# Patient Record
Sex: FEMALE | Race: WHITE | URBAN - METROPOLITAN AREA
[De-identification: names, ages, dates, MRNs, and addresses within clinical notes are randomized per-mention and may not be internally consistent; named-entity substitution may affect disease eponyms.]

---

## 2019-10-18 ENCOUNTER — OFFICE VISIT (OUTPATIENT)
Dept: FAMILY MEDICINE CLINIC | Age: 44
End: 2019-10-18
Payer: COMMERCIAL

## 2019-10-18 ENCOUNTER — HOSPITAL ENCOUNTER (OUTPATIENT)
Age: 44
Setting detail: SPECIMEN
Discharge: HOME | End: 2019-10-18
Payer: COMMERCIAL

## 2019-10-18 VITALS
SYSTOLIC BLOOD PRESSURE: 168 MMHG | RESPIRATION RATE: 18 BRPM | HEART RATE: 85 BPM | DIASTOLIC BLOOD PRESSURE: 111 MMHG | OXYGEN SATURATION: 97 % | TEMPERATURE: 98 F

## 2019-10-18 DIAGNOSIS — N30.00 ACUTE CYSTITIS WITHOUT HEMATURIA: Primary | ICD-10-CM

## 2019-10-18 DIAGNOSIS — N30.00 ACUTE CYSTITIS WITHOUT HEMATURIA: ICD-10-CM

## 2019-10-18 PROCEDURE — 99202 OFFICE O/P NEW SF 15 MIN: CPT | Performed by: NURSE PRACTITIONER

## 2019-10-18 RX ORDER — ZIPRASIDONE HYDROCHLORIDE 80 MG/1
CAPSULE ORAL
COMMUNITY

## 2019-10-18 RX ORDER — SULFAMETHOXAZOLE AND TRIMETHOPRIM 800; 160 MG/1; MG/1
1 TABLET ORAL 2 TIMES DAILY
Qty: 10 TABLET | Refills: 0 | Status: SHIPPED | OUTPATIENT
Start: 2019-10-18 | End: 2019-10-23

## 2019-10-18 RX ORDER — DILTIAZEM HYDROCHLORIDE 120 MG/1
CAPSULE, COATED, EXTENDED RELEASE ORAL
COMMUNITY

## 2019-10-18 RX ORDER — DESVENLAFAXINE 100 MG/1
TABLET, EXTENDED RELEASE ORAL
COMMUNITY

## 2019-10-18 RX ORDER — BENZTROPINE MESYLATE 2 MG/1
2 TABLET ORAL
COMMUNITY
End: 2020-05-18

## 2019-10-18 ASSESSMENT — ENCOUNTER SYMPTOMS
CONSTIPATION: 0
COUGH: 0
SHORTNESS OF BREATH: 0
CHEST TIGHTNESS: 0
NAUSEA: 0
VOMITING: 0
RESPIRATORY NEGATIVE: 1
WHEEZING: 0
DIARRHEA: 0

## 2019-10-19 LAB
CULTURE: NO GROWTH
Lab: NORMAL
SPECIMEN DESCRIPTION: NORMAL

## 2020-05-18 ENCOUNTER — OFFICE VISIT (OUTPATIENT)
Dept: FAMILY MEDICINE CLINIC | Age: 45
End: 2020-05-18
Payer: COMMERCIAL

## 2020-05-18 VITALS
TEMPERATURE: 98.8 F | HEIGHT: 64 IN | DIASTOLIC BLOOD PRESSURE: 91 MMHG | SYSTOLIC BLOOD PRESSURE: 148 MMHG | WEIGHT: 285 LBS | HEART RATE: 81 BPM | BODY MASS INDEX: 48.65 KG/M2 | OXYGEN SATURATION: 97 %

## 2020-05-18 PROCEDURE — 96372 THER/PROPH/DIAG INJ SC/IM: CPT | Performed by: PHYSICIAN ASSISTANT

## 2020-05-18 PROCEDURE — 99213 OFFICE O/P EST LOW 20 MIN: CPT | Performed by: PHYSICIAN ASSISTANT

## 2020-05-18 RX ORDER — DESVENLAFAXINE 100 MG/1
TABLET, EXTENDED RELEASE ORAL
COMMUNITY
End: 2020-05-18

## 2020-05-18 RX ORDER — CYCLOBENZAPRINE HCL 5 MG
5 TABLET ORAL 2 TIMES DAILY PRN
Qty: 20 TABLET | Refills: 0 | Status: SHIPPED | OUTPATIENT
Start: 2020-05-18 | End: 2020-05-28

## 2020-05-18 RX ORDER — METHYLPREDNISOLONE 4 MG/1
TABLET ORAL
Qty: 1 KIT | Refills: 0 | Status: SHIPPED | OUTPATIENT
Start: 2020-05-18

## 2020-05-18 RX ORDER — KETOROLAC TROMETHAMINE 30 MG/ML
60 INJECTION, SOLUTION INTRAMUSCULAR; INTRAVENOUS ONCE
Status: COMPLETED | OUTPATIENT
Start: 2020-05-18 | End: 2020-05-18

## 2020-05-18 RX ORDER — BENZTROPINE MESYLATE 1 MG/1
TABLET ORAL
COMMUNITY

## 2020-05-18 RX ORDER — ZOLPIDEM TARTRATE 5 MG/1
TABLET ORAL
COMMUNITY

## 2020-05-18 RX ORDER — IBUPROFEN 800 MG/1
800 TABLET ORAL EVERY 6 HOURS PRN
Qty: 28 TABLET | Refills: 0 | Status: SHIPPED | OUTPATIENT
Start: 2020-05-18 | End: 2020-05-25

## 2020-05-18 RX ADMIN — KETOROLAC TROMETHAMINE 60 MG: 30 INJECTION, SOLUTION INTRAMUSCULAR; INTRAVENOUS at 16:00

## 2020-05-18 ASSESSMENT — PATIENT HEALTH QUESTIONNAIRE - PHQ9
SUM OF ALL RESPONSES TO PHQ9 QUESTIONS 1 & 2: 0
2. FEELING DOWN, DEPRESSED OR HOPELESS: 0
SUM OF ALL RESPONSES TO PHQ QUESTIONS 1-9: 0
SUM OF ALL RESPONSES TO PHQ QUESTIONS 1-9: 0
1. LITTLE INTEREST OR PLEASURE IN DOING THINGS: 0

## 2020-05-18 ASSESSMENT — ENCOUNTER SYMPTOMS
ABDOMINAL PAIN: 0
VOMITING: 0
RESPIRATORY NEGATIVE: 1
NAUSEA: 0
VISUAL CHANGE: 0
BOWEL INCONTINENCE: 0

## 2020-05-18 NOTE — PATIENT INSTRUCTIONS
hours. Put a thin cloth between the ice pack and your skin. · Take pain medicines exactly as directed. ? If the doctor gave you a prescription medicine for pain, take it as prescribed. ? If you are not taking a prescription pain medicine, ask your doctor if you can take an over-the-counter medicine. · Take short walks several times a day. You can start with 5 to 10 minutes, 3 or 4 times a day, and work up to longer walks. Walk on level surfaces and avoid hills and stairs until your back is better. · Return to work and other activities as soon as you can. Continued rest without activity is usually not good for your back. · To prevent future back pain, do exercises to stretch and strengthen your back and stomach. Learn how to use good posture, safe lifting techniques, and proper body mechanics. When should you call for help? Call your doctor now or seek immediate medical care if:    · You have new or worsening numbness in your legs.     · You have new or worsening weakness in your legs. (This could make it hard to stand up.)     · You lose control of your bladder or bowels.    Watch closely for changes in your health, and be sure to contact your doctor if:    · You have a fever, lose weight, or don't feel well.     · You do not get better as expected. Where can you learn more? Go to https://[employer]emids. org and sign in to your [employer] account. Enter P807 in the [facility] box to learn more about \"Back Pain: Care Instructions. \"     If you do not have an account, please click on the \"Sign Up Now\" link. Current as of: June 26, 2019Content Version: 12.4  © 3919-9627 [employer]. Care instructions adapted under license by [facility] ([facility]). If you have questions about a medical condition or this instruction, always ask your healthcare professional. [place] [address] any warranty or liability for your use of this information.          Patient Education

## 2020-05-18 NOTE — PROGRESS NOTES
understanding of these instructions and did not have anyfurther questions or complaints. PROCEDURES:  Orders Placed This Encounter   Procedures    XR FOOT LEFT (MIN 3 VIEWS)     Standing Status:   Future     Standing Expiration Date:   5/18/2021     Order Specific Question:   Reason for exam:     Answer:   Left foot pain s/p fall    XR ANKLE LEFT (MIN 3 VIEWS)     Standing please     Standing Status:   Future     Standing Expiration Date:   5/18/2021     Order Specific Question:   Reason for exam:     Answer:   left ankle pain s/p fall 1 week ago    XR KNEE LEFT (3 VIEWS)     Standing please     Standing Status:   Future     Standing Expiration Date:   5/18/2021     Order Specific Question:   Reason for exam:     Answer:   left knee pain s/p fall    XR HIP LEFT (2-3 VIEWS)     Standing please     Standing Status:   Future     Standing Expiration Date:   5/18/2021     Order Specific Question:   Reason for exam:     Answer:   Left hip s/p fall    XR LUMBAR SPINE (2-3 VIEWS)     Standing Status:   Future     Standing Expiration Date:   5/18/2021     Order Specific Question:   Reason for exam:     Answer:   left lower back pain/ gluteal s/p fall    XR TIBIA FIBULA LEFT (2 VIEWS)     Standing please     Standing Status:   Future     Standing Expiration Date:   5/18/2021     Order Specific Question:   Reason for exam:     Answer:   lower leg pain s/p fall       No results found for this visit on 05/18/20.     FINALIMPRESSION      Visit Diagnoses and Associated Orders     Left foot pain    -  Primary    XR FOOT LEFT (MIN 3 VIEWS) [40948 Custom]   - Future Order         Acute left ankle pain        XR ANKLE LEFT (MIN 3 VIEWS) [77542 Custom]   - Future Order         Acute pain of left knee        XR KNEE LEFT (3 VIEWS) [94509 Custom]   - Future Order         Left hip pain        XR HIP LEFT (2-3 VIEWS) [75222 Custom]   - Future Order         Acute left-sided low back pain with left-sided sciatica        XR LUMBAR SPINE (2-3 VIEWS) [[id number] Custom]   - Future Order         Anterior leg pain, left        XR TIBIA FIBULA LEFT (2 VIEWS) [40424 Custom]   - Future Order         ORDERS WITHOUT AN ASSOCIATED DIAGNOSIS    zolpidem (AMBIEN) 5 MG tablet [36148]      benztropine (COGENTIN) 1 MG tablet [999]      ibuprofen (ADVIL;MOTRIN) 800 MG tablet [3845]      methylPREDNISolone (MEDROL DOSEPACK) 4 MG tablet [4991]      cyclobenzaprine (FLEXERIL) 5 MG tablet [20994]      ketorolac (TORADOL) injection 60 mg [01671]              PLAN     Return if symptoms worsen or fail to improve. DISCHARGEMEDICATIONS:  Orders Placed This Encounter   Medications    ibuprofen (ADVIL;MOTRIN) 800 MG tablet     Sig: Take 1 tablet by mouth every 6 hours as needed for Pain     Dispense:  28 tablet     Refill:  0    methylPREDNISolone (MEDROL DOSEPACK) 4 MG tablet     Sig: Take as medrol dose pack. Take by mouth. Dispense:  1 kit     Refill:  0    cyclobenzaprine (FLEXERIL) 5 MG tablet     Sig: Take 1 tablet by mouth 2 times daily as needed for Muscle spasms     Dispense:  20 tablet     Refill:  0    ketorolac (TORADOL) injection 60 mg         Plan:  1. RICE therapy  2. Tylenol/motrin for pain as needed  3. X-ray as ordered - possible treatment plan alteration as discussed. Patient instructed to return to the office if symptoms worsen, return, or have any other concerns. Patient understands and is agreeable.          Rere Powell PA-C 5/18/2020 4:27 PM

## 2020-05-19 ENCOUNTER — HOSPITAL ENCOUNTER (OUTPATIENT)
Dept: GENERAL RADIOLOGY | Age: 45
Discharge: HOME | End: 2020-05-21
Payer: COMMERCIAL

## 2020-05-19 ENCOUNTER — HOSPITAL ENCOUNTER (OUTPATIENT)
Age: 45
Discharge: HOME | End: 2020-05-21
Payer: COMMERCIAL

## 2020-05-19 PROCEDURE — 73590 X-RAY EXAM OF LOWER LEG: CPT

## 2020-05-19 PROCEDURE — 73502 X-RAY EXAM HIP UNI 2-3 VIEWS: CPT

## 2020-05-19 PROCEDURE — 72100 X-RAY EXAM L-S SPINE 2/3 VWS: CPT

## 2020-05-19 PROCEDURE — 73610 X-RAY EXAM OF ANKLE: CPT

## 2020-05-19 PROCEDURE — 73630 X-RAY EXAM OF FOOT: CPT

## 2020-05-19 PROCEDURE — 73562 X-RAY EXAM OF KNEE 3: CPT

## 2025-02-04 ENCOUNTER — TELEPHONE (OUTPATIENT)
Dept: BARIATRICS/WEIGHT MGMT | Age: 50
End: 2025-02-04

## 2025-02-28 ENCOUNTER — OFFICE VISIT (OUTPATIENT)
Dept: BARIATRICS/WEIGHT MGMT | Age: 50
End: 2025-02-28
Payer: COMMERCIAL

## 2025-02-28 VITALS
HEIGHT: 64 IN | WEIGHT: 270 LBS | OXYGEN SATURATION: 96 % | SYSTOLIC BLOOD PRESSURE: 123 MMHG | DIASTOLIC BLOOD PRESSURE: 73 MMHG | HEART RATE: 86 BPM | BODY MASS INDEX: 46.1 KG/M2

## 2025-02-28 DIAGNOSIS — K21.9 GASTROESOPHAGEAL REFLUX DISEASE WITHOUT ESOPHAGITIS: ICD-10-CM

## 2025-02-28 DIAGNOSIS — I10 ESSENTIAL HYPERTENSION: ICD-10-CM

## 2025-02-28 DIAGNOSIS — E66.01 MORBID OBESITY WITH BMI OF 45.0-49.9, ADULT: Primary | ICD-10-CM

## 2025-02-28 PROCEDURE — 3074F SYST BP LT 130 MM HG: CPT | Performed by: SURGERY

## 2025-02-28 PROCEDURE — 3078F DIAST BP <80 MM HG: CPT | Performed by: SURGERY

## 2025-02-28 PROCEDURE — 99204 OFFICE O/P NEW MOD 45 MIN: CPT | Performed by: SURGERY

## 2025-02-28 RX ORDER — LISINOPRIL 10 MG/1
1 TABLET ORAL
COMMUNITY

## 2025-02-28 RX ORDER — PHENTERMINE HYDROCHLORIDE 37.5 MG/1
TABLET ORAL
COMMUNITY

## 2025-02-28 NOTE — PROGRESS NOTES
[facility] INVASIVE BARIATRIC SURG  [address]  [address]  [place] [id number]  Dept: [phone]    SURGICAL WEIGHT MANAGEMENT PROGRAM  PROGRESS NOTE INITIAL EVALUATION     Patient: Payam Bailey        Service Date: 2/28/2025      HPI:     Chief Complaint   Patient presents with    Bariatric, Initial Visit    Weight Loss       The patient is a pleasant 49 y.o. year old female  with morbid obesity, who stands Height: 161.9 cm (5' 3.75\") tall with a weight of Weight - Scale: 122.5 kg (270 lb) , resulting in a BMI of Body mass index is 46.71 kg/m².. The patient suffers from multiple co-morbidities as a result of morbid obesity, including: Hypertension and GERD. She has suffered from obesity for many years.     The patient denies  a history of myocardial infarction, deep vein thrombosis, pulmonary embolism, renal failure, hepatic failure, and stroke. Patient denies nicotine. She does drink alcohol on occasion.     The patient has failed multiple attempts at non-surgical weight loss, and is now seeking surgical intervention to promote permanent and consistent weight loss. She  has chosen Abram-en-Y Gastric Bypass.  She is well educated regarding it, as she has recently viewed our weight loss surgery informational seminar.     Medical History:  Past Medical History:   Diagnosis Date    Bipolar disorder (HCC)        Surgical History:  Past Surgical History:   Procedure Laterality Date    BREAST LUMPECTOMY Left     CHOLECYSTECTOMY      PARTIAL HYSTERECTOMY         Family History:  No family history on file.    Social History:   Social History     Tobacco Use    Smoking status: Never    Smokeless tobacco: Never   Substance Use Topics    Alcohol use: Not Currently    Drug use: Never       Current Med List:  Current Outpatient Medications   Medication Sig Dispense Refill    lisinopril (PRINIVIL;ZESTRIL) 10 MG tablet Take 1 tablet by mouth Every Day

## 2025-03-04 ENCOUNTER — NURSE ONLY (OUTPATIENT)
Dept: BARIATRICS/WEIGHT MGMT | Age: 50
End: 2025-03-04

## 2025-03-04 VITALS — BODY MASS INDEX: 46.69 KG/M2 | WEIGHT: 269.9 LBS

## 2025-03-04 DIAGNOSIS — E66.01 MORBID OBESITY WITH BMI OF 45.0-49.9, ADULT: Primary | ICD-10-CM

## 2025-03-04 NOTE — PROGRESS NOTES
mins per day for at least 24 days of the month. \"Exercise for Health\" and exercise logs were provided to the patient along with education on the importance of exercise for weight management. All additional goals set with the patient are displayed above.   Will follow up each month and provide support as patient begins to add physical activity to lifestyle.    Monitoring and Evaluation:  Monitor and review goals, adjust as needed.  Follow up monthly for supervised diet and exercise.     Gisel Babin MS, RD, LD  Clinical Dietitian  [facility]  [phone]

## 2025-03-19 ENCOUNTER — CLINICAL SUPPORT (OUTPATIENT)
Dept: BARIATRICS/WEIGHT MGMT | Age: 50
End: 2025-03-19

## 2025-04-08 ENCOUNTER — OFFICE VISIT (OUTPATIENT)
Dept: BARIATRICS/WEIGHT MGMT | Age: 50
End: 2025-04-08
Payer: COMMERCIAL

## 2025-04-08 VITALS
SYSTOLIC BLOOD PRESSURE: 127 MMHG | HEIGHT: 64 IN | DIASTOLIC BLOOD PRESSURE: 72 MMHG | WEIGHT: 267 LBS | HEART RATE: 80 BPM | OXYGEN SATURATION: 97 % | BODY MASS INDEX: 45.58 KG/M2

## 2025-04-08 DIAGNOSIS — G47.33 OSA (OBSTRUCTIVE SLEEP APNEA): ICD-10-CM

## 2025-04-08 DIAGNOSIS — I10 ESSENTIAL HYPERTENSION: Primary | ICD-10-CM

## 2025-04-08 DIAGNOSIS — E55.9 VITAMIN D INSUFFICIENCY: ICD-10-CM

## 2025-04-08 DIAGNOSIS — Z86.2 HISTORY OF ANEMIA: ICD-10-CM

## 2025-04-08 DIAGNOSIS — E66.01 MORBID OBESITY WITH BMI OF 45.0-49.9, ADULT: ICD-10-CM

## 2025-04-08 DIAGNOSIS — F31.9 BIPOLAR DEPRESSION (HCC): ICD-10-CM

## 2025-04-08 DIAGNOSIS — R73.9 ELEVATED BLOOD SUGAR: ICD-10-CM

## 2025-04-08 DIAGNOSIS — K21.9 GASTROESOPHAGEAL REFLUX DISEASE WITHOUT ESOPHAGITIS: ICD-10-CM

## 2025-04-08 PROCEDURE — 99214 OFFICE O/P EST MOD 30 MIN: CPT | Performed by: NURSE PRACTITIONER

## 2025-04-08 PROCEDURE — 3078F DIAST BP <80 MM HG: CPT | Performed by: NURSE PRACTITIONER

## 2025-04-08 PROCEDURE — 3074F SYST BP LT 130 MM HG: CPT | Performed by: NURSE PRACTITIONER

## 2025-04-08 NOTE — PROGRESS NOTES
Medical Nutrition Therapy  Supervised Diet & Exercise Pre-Op   Metabolic and Bariatric Surgery  Visit 1 out of 3    Nutrition Assessment:  Patient's start weight is 267 lbs. Patient is working toward bariatric surgery for RNY Bypass    Vitals:   Wt Readings from Last 3 Encounters:   04/08/25 121.1 kg (267 lb)   03/04/25 122.4 kg (269 lb 14.4 oz)   02/28/25 122.5 kg (270 lb)     Nutrition Diagnosis:  Knowledge deficit related to healthy behaviors that support weight management after weight loss surgery as evidenced by Body mass index is 46.56 kg/m².    Nutrition Intervention:  Nutrition Prescription:  Bariatric Pre-op Diet    Nutrition Counseling:  Reviewed patients current behaviors and habits and discussed barriers for change. Utilized motivational interviewing to set patient specific goals that promote bariatric behaviors. Patient displays understanding of the goals discussed.     Nutrition Education:   Reviewed and available in the Bariatric Education Binder.                                                Goals: The patient has a list and explanation of every goal in their \"Bariatric Education Binder\". Changes in eating patterns to promote health are noted below. See below for goals that patient is continuing to work on and completed. All goals were planned with and agreed on by the patient.    Patient completed  6 out of 6 goals.  Treatment goals for upcoming month: Continue all previous goals and add #8, 9, and 10      I want to have surgery because I have been heavy my entire life. I'm done.      Goal C N/A Notes:   [] 1 I will read the education binder provided to me.    [x] []    [] 2 I will make my psychological evaluation appoinment. [x] []    [x] 3 I will bring my binder to every appointment.   [] []    [] 4 I will eliminate all tobacco/nicotine.   [] [x]    [x] 5 I will limit alcoholic beverages to 4-6 oz per week.   [] [] 1-2 drinks on a weekend    [] 6 I will limit dining out to 3 times per week or 
with Auto Differential     Standing Status:   Future     Expected Date:   4/8/2025     Expiration Date:   4/8/2026    Comprehensive Metabolic Panel     Standing Status:   Future     Expected Date:   4/8/2025     Expiration Date:   4/8/2026    Hemoglobin A1C     Standing Status:   Future     Expected Date:   4/8/2025     Expiration Date:   4/8/2026    Iron and TIBC     Standing Status:   Future     Expected Date:   4/8/2025     Expiration Date:   4/8/2026     Is Patient Fasting?:   yes     No of Hours?:   12    Lipid Panel     Standing Status:   Future     Expected Date:   4/8/2025     Expiration Date:   4/8/2026     Is Patient Fasting?/# of Hours:   12    Magnesium     Standing Status:   Future     Expected Date:   4/8/2025     Expiration Date:   4/8/2026    PTH, Intact     Standing Status:   Future     Expected Date:   4/8/2025     Expiration Date:   4/8/2026    TSH     Standing Status:   Future     Expected Date:   4/8/2025     Expiration Date:   4/8/2026    Vitamin A     Standing Status:   Future     Expected Date:   4/8/2025     Expiration Date:   4/8/2026    Vitamin B1     Standing Status:   Future     Expected Date:   4/8/2025     Expiration Date:   4/8/2026    Vitamin B12 & Folate     Standing Status:   Future     Expected Date:   4/8/2025     Expiration Date:   4/8/2026    Vitamin D 25 Hydroxy     Standing Status:   Future     Expected Date:   4/8/2025     Expiration Date:   4/8/2026    Zinc     Standing Status:   Future     Expected Date:   4/8/2025     Expiration Date:   4/8/2026    T4, Free     Standing Status:   Future     Expected Date:   4/8/2025     Expiration Date:   4/8/2026       Prescriptions this encounter:  No orders of the defined types were placed in this encounter.      Electronically signed by:  CAMILLA Salmeron

## 2025-04-10 ENCOUNTER — PREP FOR PROCEDURE (OUTPATIENT)
Dept: BARIATRICS/WEIGHT MGMT | Age: 50
End: 2025-04-10

## 2025-04-10 DIAGNOSIS — E66.01 MORBID OBESITY (HCC): ICD-10-CM

## 2025-04-10 PROBLEM — K21.9 GERD (GASTROESOPHAGEAL REFLUX DISEASE): Status: ACTIVE | Noted: 2025-04-10

## 2025-04-18 ENCOUNTER — TELEPHONE (OUTPATIENT)
Dept: BARIATRICS/WEIGHT MGMT | Age: 50
End: 2025-04-18

## 2025-04-18 ENCOUNTER — PATIENT MESSAGE (OUTPATIENT)
Dept: BARIATRICS/WEIGHT MGMT | Age: 50
End: 2025-04-18

## 2025-04-18 NOTE — TELEPHONE ENCOUNTER
NO ANSWER VOICEMAIL NOT SET UP SENT MY CHART MESSAGE    Hello,    You are schedule for an EGD on 04/25/25 at the [facility]. Please arrive at 6:30 am.     [facility]:  [address] , [place] [address]    Patient Instructions: PLEASE READ!!!  The night before your procedure no food or drink after midnight.  Only CLEAR liquids 24 hours prior to procedure.  NO alcohol 24 hours prior to procedure   Take your medications with sips of water, except those that need to be taken with food.  If you take aspirin, Plavix, Coumadin (Warfarin) or another blood thinner please ask the prescribing provider if you can stop it 7 days prior to the EGD.  If you are on any GLP-1 medications (Trulicity, Semaglutide, Mounjaro,Zepbound, Victoza, Saxenda) you will \"STOP\" TWO weeks prior to procedure.  If you are having an EGD with [facility] and are taking any PPIs (Prilosec, Prevacid, Protonix or Nexium) please stop it 7 days prior to your procedure.  If you are late you may be asked to reschedule.   You MUST bring a support person with you, they need to drive you home.  You will not be able to drive after the procedure.  If you are a female, you may be asked to take a urine pregnancy test.       Any questions , concerns or need to cancel, please call [phone].    Thank you,    [facility].

## 2025-04-23 NOTE — TELEPHONE ENCOUNTER
Patient called to cancel EGD on 4-25-25 at 8:30 am , she will call to reschedule. Called and spoke with Esvin at [facility]'s scheduling to cancel.